# Patient Record
Sex: FEMALE | Race: WHITE | NOT HISPANIC OR LATINO | Employment: FULL TIME | URBAN - METROPOLITAN AREA
[De-identification: names, ages, dates, MRNs, and addresses within clinical notes are randomized per-mention and may not be internally consistent; named-entity substitution may affect disease eponyms.]

---

## 2019-06-25 ENCOUNTER — HOSPITAL ENCOUNTER (EMERGENCY)
Facility: HOSPITAL | Age: 34
Discharge: HOME | End: 2019-06-26
Attending: EMERGENCY MEDICINE | Admitting: EMERGENCY MEDICINE

## 2019-06-25 DIAGNOSIS — E86.0 DEHYDRATION: ICD-10-CM

## 2019-06-25 DIAGNOSIS — F10.929 ALCOHOL INTOXICATION (HCC): Primary | ICD-10-CM

## 2019-06-25 DIAGNOSIS — R55 NEAR SYNCOPE: ICD-10-CM

## 2019-06-25 LAB
ALBUMIN SERPL BCP-MCNC: 3.4 G/DL (ref 3.5–5)
ALP SERPL-CCNC: 88 U/L (ref 46–116)
ALT SERPL W P-5'-P-CCNC: 14 U/L (ref 12–78)
ANION GAP SERPL CALCULATED.3IONS-SCNC: 12 MMOL/L (ref 4–13)
AST SERPL W P-5'-P-CCNC: 12 U/L (ref 5–45)
BASOPHILS # BLD AUTO: 0.06 THOUSANDS/ΜL (ref 0–0.1)
BASOPHILS NFR BLD AUTO: 1 % (ref 0–1)
BILIRUB DIRECT SERPL-MCNC: 0.09 MG/DL (ref 0–0.2)
BILIRUB SERPL-MCNC: 0.3 MG/DL (ref 0.2–1)
BUN SERPL-MCNC: 12 MG/DL (ref 5–25)
CALCIUM SERPL-MCNC: 8.2 MG/DL (ref 8.3–10.1)
CHLORIDE SERPL-SCNC: 105 MMOL/L (ref 100–108)
CO2 SERPL-SCNC: 23 MMOL/L (ref 21–32)
CREAT SERPL-MCNC: 0.89 MG/DL (ref 0.6–1.3)
EOSINOPHIL # BLD AUTO: 0.1 THOUSAND/ΜL (ref 0–0.61)
EOSINOPHIL NFR BLD AUTO: 1 % (ref 0–6)
ERYTHROCYTE [DISTWIDTH] IN BLOOD BY AUTOMATED COUNT: 15.2 % (ref 11.6–15.1)
ETHANOL SERPL-MCNC: 147 MG/DL (ref 0–3)
GFR SERPL CREATININE-BSD FRML MDRD: 85 ML/MIN/1.73SQ M
GLUCOSE SERPL-MCNC: 90 MG/DL (ref 65–140)
HCG SERPL QL: NEGATIVE
HCT VFR BLD AUTO: 42.6 % (ref 34.8–46.1)
HGB BLD-MCNC: 14.1 G/DL (ref 11.5–15.4)
IMM GRANULOCYTES # BLD AUTO: 0.1 THOUSAND/UL (ref 0–0.2)
IMM GRANULOCYTES NFR BLD AUTO: 1 % (ref 0–2)
LACTATE SERPL-SCNC: 3.4 MMOL/L (ref 0.5–2)
LYMPHOCYTES # BLD AUTO: 2.42 THOUSANDS/ΜL (ref 0.6–4.47)
LYMPHOCYTES NFR BLD AUTO: 22 % (ref 14–44)
MCH RBC QN AUTO: 30.2 PG (ref 26.8–34.3)
MCHC RBC AUTO-ENTMCNC: 33.1 G/DL (ref 31.4–37.4)
MCV RBC AUTO: 91 FL (ref 82–98)
MONOCYTES # BLD AUTO: 0.71 THOUSAND/ΜL (ref 0.17–1.22)
MONOCYTES NFR BLD AUTO: 6 % (ref 4–12)
NEUTROPHILS # BLD AUTO: 7.89 THOUSANDS/ΜL (ref 1.85–7.62)
NEUTS SEG NFR BLD AUTO: 69 % (ref 43–75)
NRBC BLD AUTO-RTO: 0 /100 WBCS
PLATELET # BLD AUTO: 549 THOUSANDS/UL (ref 149–390)
PMV BLD AUTO: 9.9 FL (ref 8.9–12.7)
POTASSIUM SERPL-SCNC: 3.2 MMOL/L (ref 3.5–5.3)
PROT SERPL-MCNC: 7.2 G/DL (ref 6.4–8.2)
RBC # BLD AUTO: 4.67 MILLION/UL (ref 3.81–5.12)
SODIUM SERPL-SCNC: 140 MMOL/L (ref 136–145)
WBC # BLD AUTO: 11.28 THOUSAND/UL (ref 4.31–10.16)

## 2019-06-25 PROCEDURE — 85025 COMPLETE CBC W/AUTO DIFF WBC: CPT | Performed by: EMERGENCY MEDICINE

## 2019-06-25 PROCEDURE — 84703 CHORIONIC GONADOTROPIN ASSAY: CPT | Performed by: EMERGENCY MEDICINE

## 2019-06-25 PROCEDURE — 83605 ASSAY OF LACTIC ACID: CPT | Performed by: EMERGENCY MEDICINE

## 2019-06-25 PROCEDURE — 80076 HEPATIC FUNCTION PANEL: CPT | Performed by: EMERGENCY MEDICINE

## 2019-06-25 PROCEDURE — 80048 BASIC METABOLIC PNL TOTAL CA: CPT | Performed by: EMERGENCY MEDICINE

## 2019-06-25 PROCEDURE — 99283 EMERGENCY DEPT VISIT LOW MDM: CPT | Performed by: EMERGENCY MEDICINE

## 2019-06-25 PROCEDURE — 96374 THER/PROPH/DIAG INJ IV PUSH: CPT

## 2019-06-25 PROCEDURE — 36415 COLL VENOUS BLD VENIPUNCTURE: CPT | Performed by: EMERGENCY MEDICINE

## 2019-06-25 PROCEDURE — 96361 HYDRATE IV INFUSION ADD-ON: CPT

## 2019-06-25 PROCEDURE — 99284 EMERGENCY DEPT VISIT MOD MDM: CPT

## 2019-06-25 PROCEDURE — 93005 ELECTROCARDIOGRAM TRACING: CPT

## 2019-06-25 PROCEDURE — 80320 DRUG SCREEN QUANTALCOHOLS: CPT | Performed by: EMERGENCY MEDICINE

## 2019-06-25 RX ORDER — ONDANSETRON 2 MG/ML
4 INJECTION INTRAMUSCULAR; INTRAVENOUS ONCE
Status: COMPLETED | OUTPATIENT
Start: 2019-06-25 | End: 2019-06-25

## 2019-06-25 RX ADMIN — SODIUM CHLORIDE 1000 ML: 0.9 INJECTION, SOLUTION INTRAVENOUS at 23:12

## 2019-06-25 RX ADMIN — SODIUM CHLORIDE 1000 ML: 0.9 INJECTION, SOLUTION INTRAVENOUS at 23:50

## 2019-06-25 RX ADMIN — ONDANSETRON 4 MG: 2 INJECTION INTRAMUSCULAR; INTRAVENOUS at 22:37

## 2019-06-26 VITALS
RESPIRATION RATE: 18 BRPM | DIASTOLIC BLOOD PRESSURE: 60 MMHG | OXYGEN SATURATION: 99 % | HEART RATE: 74 BPM | WEIGHT: 185 LBS | TEMPERATURE: 97.5 F | BODY MASS INDEX: 29.86 KG/M2 | SYSTOLIC BLOOD PRESSURE: 110 MMHG

## 2019-06-26 PROCEDURE — 96361 HYDRATE IV INFUSION ADD-ON: CPT

## 2019-06-26 RX ORDER — ONDANSETRON 4 MG/1
4 TABLET, ORALLY DISINTEGRATING ORAL EVERY 8 HOURS PRN
Qty: 12 TABLET | Refills: 0 | Status: SHIPPED | OUTPATIENT
Start: 2019-06-26 | End: 2019-06-29

## 2019-06-26 NOTE — ED PROVIDER NOTES
History  Chief Complaint   Patient presents with    Seizure - Prior Hx Of     pt was at [facility], had one captain shelley drink and one drink with multiple shots in it; pt then had multiple witnessed seizures per EMS at [facility]; pt then had two seizures en route, was given 2 5mg versed per ems pta  pt states she had seizures as a young child, does not take any medications  pt currently awake and oriented  HPI   16-year-old female with history of anxiety, depression, borderline personality disorder, and migraines presents to the emergency department with alcohol intoxication and concern for possible seizure  History comes from patient and her boyfriend  Boyfriend states that he and the patient were drinking a lot of sugary alcoholic drinks while out in the sun at a water park today  At some point she began to feel dizzy and unwell while in a gated pool are  Boyfriend asked staff if they could open the gate to get out of the pool area to rest   Staff brought over medical evaluation and eventually called EMS, as patient was sleepy and did not feel well, as if she might pass out  (Boyfriend states that patient did not have any episodes of LOC at the park)  Upon EMS arrival patient reportedly had what appeared to be seizures, as she was shaking  She was given 2 5 mg Versed  On arrival in the ED patient is sleepy, but arouses to voice  She complains of mild nausea and fatigue, but denies any additional complaints  She does recall feeling sick earlier today as if she had too much alcohol, which she otherwise does not remember much about the events that brought her to the emergency department today  She denies any recent head injury or loss of consciousness  She denies any recent seizures and states that she had seizures only while she was sick as a child  She was never on any medications for seizures    On review of systems, she denies any recent fevers, chills, chest pain, shortness of breath, abdominal pain, vomiting, weakness, numbness, paresthesias, or complaints other than stated above  Patient does not believe she is pregnant  She denies any drug use  Prior to Admission Medications   Prescriptions Last Dose Informant Patient Reported? Taking? FLUoxetine (PROzac) 20 mg capsule   Yes No   Sig: Take 20 mg by mouth daily  buPROPion (WELLBUTRIN XL) 300 mg 24 hr tablet   Yes No   Sig: Take 300 mg by mouth daily  drospirenone-ethinyl estradiol (MAURICIO) 3-0 02 MG per tablet   Yes No   Sig: Take 1 tablet by mouth daily  Facility-Administered Medications: None       Past Medical History:   Diagnosis Date    Anxiety     Borderline personality disorder ([facility] [address] )     Chronic headaches     Depression     Tonsillitis        Past Surgical History:   Procedure Laterality Date    CERVICAL BIOPSY  W/ LOOP ELECTRODE EXCISION      PILONIDAL CYST / SINUS EXCISION      AL REMOVAL OF TONSILS,12+ Y/O N/A 4/5/2016    Procedure: TONSILECTOMY;  Surgeon: Reji Thakur DO;  Location: [facility] OR;  Service: ENT    WISDOM TOOTH EXTRACTION         History reviewed  No pertinent family history  I have reviewed and agree with the history as documented  Social History     Tobacco Use    Smoking status: Former Smoker     Packs/day: 0 25     Years: 3 00     Pack years: 0 75    Smokeless tobacco: Never Used   Substance Use Topics    Alcohol use: No    Drug use: No        Review of Systems   Constitutional: Positive for fatigue  Negative for chills and fever  Respiratory: Negative for shortness of breath  Gastrointestinal: Positive for nausea  Negative for abdominal pain and vomiting  Musculoskeletal: Negative for arthralgias and joint swelling  Skin: Negative for rash and wound  Allergic/Immunologic: Negative for immunocompromised state  Neurological: Positive for dizziness  Psychiatric/Behavioral: The patient is not nervous/anxious  All other systems reviewed and are negative        Physical Exam  Physical Exam   Constitutional: She is oriented to person, place, and time  She appears well-nourished  No distress  HENT:   Head: Normocephalic and atraumatic  Eyes: EOM are normal    Neck: Normal range of motion  Neck supple  Cardiovascular: Normal rate and regular rhythm  Pulmonary/Chest: Effort normal and breath sounds normal  No respiratory distress  Abdominal: Soft  She exhibits no distension  There is no tenderness  Musculoskeletal: Normal range of motion  Neurological: She is alert and oriented to person, place, and time  Sleeping, but arouses to voice   Skin: Skin is warm and dry  She is not diaphoretic  Psychiatric: She has a normal mood and affect  Her behavior is normal    Nursing note and vitals reviewed        Vital Signs  ED Triage Vitals   Temperature Pulse Respirations Blood Pressure SpO2   06/25/19 2301 06/25/19 2145 06/25/19 2145 06/25/19 2145 06/25/19 2145   97 5 °F (36 4 °C) 63 21 113/73 99 %      Temp Source Heart Rate Source Patient Position - Orthostatic VS BP Location FiO2 (%)   06/25/19 2301 06/25/19 2351 06/25/19 2351 06/25/19 2351 --   Oral Monitor Lying Right arm       Pain Score       --                  Vitals:    06/25/19 2145 06/25/19 2351 06/26/19 0130   BP: 113/73 96/55 110/60   Pulse: 63 65 74   Patient Position - Orthostatic VS:  Lying Lying         Visual Acuity      ED Medications  Medications   ondansetron (ZOFRAN) injection 4 mg (4 mg Intravenous Given 6/25/19 2237)   sodium chloride 0 9 % bolus 1,000 mL (0 mL Intravenous Stopped 6/25/19 2350)   sodium chloride 0 9 % bolus 1,000 mL (0 mL Intravenous Stopped 6/26/19 0130)       Diagnostic Studies  Results Reviewed     Procedure Component Value Units Date/Time    Lactic acid, plasma [[id number]]  (Abnormal) Collected:  06/25/19 2249    Lab Status:  Final result Specimen:  Blood from Arm, Right Updated:  06/25/19 2330     LACTIC ACID 3 4 mmol/L     Narrative:       Result may be elevated if tourniquet was used during collection      Hepatic function panel [[id number]]  (Abnormal) Collected:  06/25/19 2248    Lab Status:  Final result Specimen:  Blood from Arm, Right Updated:  06/25/19 2315     Total Bilirubin 0 30 mg/dL      Bilirubin, Direct 0 09 mg/dL      Alkaline Phosphatase 88 U/L      AST 12 U/L      ALT 14 U/L      Total Protein 7 2 g/dL      Albumin 3 4 g/dL     hCG, qualitative pregnancy [[id number]]  (Normal) Collected:  06/25/19 2248    Lab Status:  Final result Specimen:  Blood from Arm, Right Updated:  06/25/19 2315     Preg, Serum Negative    Basic metabolic panel [[id number]]  (Abnormal) Collected:  06/25/19 2248    Lab Status:  Final result Specimen:  Blood from Arm, Right Updated:  06/25/19 2309     Sodium 140 mmol/L      Potassium 3 2 mmol/L      Chloride 105 mmol/L      CO2 23 mmol/L      ANION GAP 12 mmol/L      BUN 12 mg/dL      Creatinine 0 89 mg/dL      Glucose 90 mg/dL      Calcium 8 2 mg/dL      eGFR 85 ml/min/1 73sq m     Narrative:       [place] guidelines for Chronic Kidney Disease (CKD):     Stage 1 with normal or high GFR (GFR > 90 mL/min/1 73 square meters)    Stage 2 Mild CKD (GFR = 60-89 mL/min/1 73 square meters)    Stage 3A Moderate CKD (GFR = 45-59 mL/min/1 73 square meters)    Stage 3B Moderate CKD (GFR = 30-44 mL/min/1 73 square meters)    Stage 4 Severe CKD (GFR = 15-29 mL/min/1 73 square meters)    Stage 5 End Stage CKD (GFR <15 mL/min/1 73 square meters)  Note: GFR calculation is accurate only with a steady state creatinine    Ethanol [[id number]]  (Abnormal) Collected:  06/25/19 2248    Lab Status:  Final result Specimen:  Blood from Arm, Right Updated:  06/25/19 2305     Ethanol Lvl 147 mg/dL     CBC and differential [[id number]]  (Abnormal) Collected:  06/25/19 2248    Lab Status:  Final result Specimen:  Blood from Arm, Right Updated:  06/25/19 2254     WBC 11 28 Thousand/uL      RBC 4 67 Million/uL      Hemoglobin 14 1 g/dL      Hematocrit 42 6 % MCV 91 fL      MCH 30 2 pg      MCHC 33 1 g/dL      RDW 15 2 %      MPV 9 9 fL      Platelets 505 Thousands/uL      nRBC 0 /100 WBCs      Neutrophils Relative 69 %      Immat GRANS % 1 %      Lymphocytes Relative 22 %      Monocytes Relative 6 %      Eosinophils Relative 1 %      Basophils Relative 1 %      Neutrophils Absolute 7 89 Thousands/µL      Immature Grans Absolute 0 10 Thousand/uL      Lymphocytes Absolute 2 42 Thousands/µL      Monocytes Absolute 0 71 Thousand/µL      Eosinophils Absolute 0 10 Thousand/µL      Basophils Absolute 0 06 Thousands/µL                  No orders to display              Procedures  Procedures       ED Course  ED Course as of Jun 28 0341 Tue Jun 25, 2019   2311 MEDICAL ALCOHOL(!): 147   2325 EKG: NSR @ 68 BPM, normal axis, normal intervals, slightly prolonged QT      2331 dehydrated   LACTIC ACID(!!): 3 4                               MDM    Disposition  Final diagnoses:   Alcohol intoxication ([facility] [address] )   Dehydration   Near syncope     Time reflects when diagnosis was documented in both MDM as applicable and the Disposition within this note     Time User Action Codes Description Comment    6/25/2019 11:24 PM Garlan Nay Add [F10 929] Alcohol intoxication ([address] )     6/25/2019 11:24 PM Alvia Slim [E86 0] Dehydration     6/25/2019 11:24 PM Garlan Nay Add [R55] Near syncope       ED Disposition     ED Disposition Condition Date/Time Comment    Discharge Stable Tue Jun 25, 2019 11:24 PM Isaac Gilliland discharge to home/self care              Follow-up Information     Follow up With Specialties Details Why Contact Info Additional Information    Myrtha Mcburney, PA-C Physician Assistant  As needed, If symptoms worsen [address] [facility]  [address]  [facility]  If symptoms worsen [address] [facility] [id number] [phone] [place] ED, [address] [facility] [place], [facility] [place], [address]          Discharge Medication List as of 6/25/2019 11:25 PM      CONTINUE these medications which have NOT CHANGED    Details   buPROPion (WELLBUTRIN XL) 300 mg 24 hr tablet Take 300 mg by mouth daily  , Until Discontinued, Historical Med      drospirenone-ethinyl estradiol (MAURICIO) 3-0 02 MG per tablet Take 1 tablet by mouth daily  , Until Discontinued, Historical Med      FLUoxetine (PROzac) 20 mg capsule Take 20 mg by mouth daily  , Until Discontinued, Historical Med           No discharge procedures on file      ED Provider  Electronically Signed by           Letty Diamond MD  06/28/19 1812

## 2019-06-27 LAB
ATRIAL RATE: 68 BPM
P AXIS: 66 DEGREES
PR INTERVAL: 180 MS
QRS AXIS: 61 DEGREES
QRSD INTERVAL: 80 MS
QT INTERVAL: 462 MS
QTC INTERVAL: 491 MS
T WAVE AXIS: 35 DEGREES
VENTRICULAR RATE: 68 BPM

## 2019-06-27 PROCEDURE — 93010 ELECTROCARDIOGRAM REPORT: CPT | Performed by: INTERNAL MEDICINE
